# Patient Record
Sex: FEMALE | Race: WHITE | HISPANIC OR LATINO | URBAN - METROPOLITAN AREA
[De-identification: names, ages, dates, MRNs, and addresses within clinical notes are randomized per-mention and may not be internally consistent; named-entity substitution may affect disease eponyms.]

---

## 2020-12-21 ENCOUNTER — APPOINTMENT (RX ONLY)
Dept: URBAN - METROPOLITAN AREA CLINIC 102 | Facility: CLINIC | Age: 75
Setting detail: DERMATOLOGY
End: 2020-12-21

## 2020-12-21 DIAGNOSIS — L29.8 OTHER PRURITUS: ICD-10-CM

## 2020-12-21 DIAGNOSIS — L29.89 OTHER PRURITUS: ICD-10-CM

## 2020-12-21 PROCEDURE — ? ORDER TESTS

## 2020-12-21 PROCEDURE — 99202 OFFICE O/P NEW SF 15 MIN: CPT

## 2020-12-21 PROCEDURE — ? COUNSELING

## 2020-12-21 NOTE — PROCEDURE: COUNSELING
Detail Level: Generalized
Patient Specific Counseling (Will Not Stick From Patient To Patient): Recommend anti itch lotion and claratin for itch

## 2023-04-04 ENCOUNTER — APPOINTMENT (RX ONLY)
Dept: URBAN - METROPOLITAN AREA CLINIC 81 | Facility: CLINIC | Age: 78
Setting detail: DERMATOLOGY
End: 2023-04-04

## 2023-04-04 DIAGNOSIS — Z41.9 ENCOUNTER FOR PROCEDURE FOR PURPOSES OTHER THAN REMEDYING HEALTH STATE, UNSPECIFIED: ICD-10-CM

## 2023-04-04 PROCEDURE — ? COSMETIC QUOTE

## 2023-04-04 PROCEDURE — ? COSMETIC CONSULTATION: BOTOX

## 2023-04-04 NOTE — PROCEDURE: COSMETIC QUOTE
Dysport Price Per Unit: 15
Naseem Price Per Syringe: [address]
Notice: We have created a more complete Cosmetic Quote plan. The procedure name is also Cosmetic Quote. Please review the new plan and hide the Cosmetic Quote plan you do not want to use.
Restylane Price Per Syringe: 500
Manpreet Price Per Syringe: [phone] [facility]
Discount Percentage: 0
Units Of Botox: [facility]
Juvederm Price Per Syringe: [address] [facility] [address]
Botox Price Per Unit: 12
Voluma Price Per Syringe: [address] [facility]
Detail Level: Simple

## 2023-07-28 ENCOUNTER — APPOINTMENT (RX ONLY)
Dept: URBAN - METROPOLITAN AREA CLINIC 81 | Facility: CLINIC | Age: 78
Setting detail: DERMATOLOGY
End: 2023-07-28

## 2023-07-28 DIAGNOSIS — Z41.9 ENCOUNTER FOR PROCEDURE FOR PURPOSES OTHER THAN REMEDYING HEALTH STATE, UNSPECIFIED: ICD-10-CM

## 2023-07-28 PROCEDURE — ? BOTOX

## 2023-07-28 PROCEDURE — ? COSMETIC QUOTE

## 2023-07-28 NOTE — PROCEDURE: COSMETIC QUOTE
Laser 12 Units: 0
Injectable 10 Free Text Discount (In Dollars- Use Only Numbers And Decimals): 0.00
Laser 8: Profound-Cheek Scarring
Laser 4 Price/Unit (In Dollars- Use Only Numbers And Decimals): 1250.00
Laser 16 Price/Unit (In Dollars- Use Only Numbers And Decimals): 400.00
Injectable 7 Price/Unit (In Dollars- Use Only Numbers And Decimals): 800.00
Injectable  11: Botox
Laser 1 Price/Unit (In Dollars- Use Only Numbers And Decimals): 2500.00
Misc Procedure 8 Price/Unit (In Dollars- Use Only Numbers And Decimals): 250.00
Misc Procedure 1: Threadlift Per Thread
Laser 5: SK Removal-Face
Laser 13 Price/Unit (In Dollars- Use Only Numbers And Decimals): 1500.00
Injectable 4 Price/Unit (In Dollars- Use Only Numbers And Decimals): 795.00
Injectable  11 Units: [address] [facility]
Misc Procedure 5 Price/Unit (In Dollars- Use Only Numbers And Decimals): 2000.00
Injectable 8: Noel
Laser 17: LHR-Arms
Laser 2: Full Face and Neck-Fusion
Laser 10 Price/Unit (In Dollars- Use Only Numbers And Decimals): 1000.00
Injectable  12 Price/Unit (In Dollars- Use Only Numbers And Decimals): 12.00
Laser 14: LHR-Lip
Misc Procedure 2 Price/Unit (In Dollars- Use Only Numbers And Decimals): 200.00
Injectable 1 Price/Unit (In Dollars- Use Only Numbers And Decimals): 650.00
Injectable 5: Volbella 0.55cc
Include Sales Tax On Facility Fees: No
Injectable 5 Price/Unit (In Dollars- Use Only Numbers And Decimals): 395.00
Laser 6: Sk Removal-Face and Neck
Laser 18: LHR-Legs/Back-TBD
Face Procedure 1: mole removal
Laser 3: Full Face-Mid Depth
Laser 11 Price/Unit (In Dollars- Use Only Numbers And Decimals): 500.00
Injectable  13 Price/Unit (In Dollars- Use Only Numbers And Decimals): 600.00
Laser 15: Yoana Rodrigues
Number Of Months: 1
Injectable 6: Fabrizio Muñoz
Misc Procedure 7: [address]
Misc Procedure 3 Price/Unit (In Dollars- Use Only Numbers And Decimals): 10.00
Laser 12: IPL Photoface-Face and Neck
Misc Procedure 4: Threads Neck Lift
Injectable 3: Versa
Injectable  14: Qwo/Session
Laser 16: Emmie
Injectable 7: Volux
Detail Level: Zone
Misc Procedure 8: Thread for pox scarring-cheeks
Laser 1: Full Face-Fusion
Laser 13: IPL Photofacial-Face, Neck, Chest
Misc Procedure 1 Price/Unit (In Dollars- Use Only Numbers And Decimals): 225.00
Injectable 4: Volbella 1cc
Injectable 11 Free Text Discount (In Dollars- Use Only Numbers And Decimals): [address]
Misc Procedure 5: Threads Eyelift
Include Sales Tax On Surgeon's Fees: Yes
Laser 9: CO2RE- Pox scarring-cheeks
Injectable 1: Radiesse
Misc Procedure 2: Collagen threads-Lip - lips
Injectable  12: Xeomin
Laser 2 Price/Unit (In Dollars- Use Only Numbers And Decimals): 3000.00
Misc Procedure 6: Threads Browlift
Laser 10: CO2RE Intima
Additional Note (Will Following Above Verbiage): Lex in July- buy 30 units get 10 free\\n\\nPatient had a total of 38 units; received 8 units free from Botox sample bottle\\n Lot [id number] Exp 
Injectable 2: Voluma
Laser 11: IPL Photofacial
Injectable  13: Kybella/Vial
Laser 3 Price/Unit (In Dollars- Use Only Numbers And Decimals): 1800.00
Misc Procedure 3: Collagen Stimulating threads Per thread only
Laser 15 Price/Unit (In Dollars- Use Only Numbers And Decimals): 300.00
Laser 7: VV/PPP on Genitals
Misc Procedure 7 Price/Unit (In Dollars- Use Only Numbers And Decimals): 50.00
Injectable 6 Price/Unit (In Dollars- Use Only Numbers And Decimals): 700.00
Laser 4: Full Face-Light
Laser 12 Price/Unit (In Dollars- Use Only Numbers And Decimals): 900.00

## 2023-07-28 NOTE — PROCEDURE: BOTOX
Additional Area 6 Location: Crow’s
Left Periorbital Units: 0
Detail Level: Detailed
Show Lcl Units: No
Show Additional Area 5: Yes
Incrementing Botox Units: By 0.5 Units
Additional Area 6 Units: 24
Show Inventory Tab: Show
Additional Area 4 Location: Bunny Lines
Additional Area 1 Location: Lat Brow
Additional Area 1 Units: 4
Forehead Units: 10
Additional Area 3 Location: Tear Trough
Consent: Written consent obtained. Risks include but not limited to lid/brow ptosis, bruising, swelling, diplopia, temporary effect, incomplete chemical denervation.
Dilution (U/0.1 Cc): 2
Post-Care Instructions: Patient instructed to avoid lying down or bending over for 4 hours and limit physical activity for 24 hours. Rec patient exercise facial muscles for 20-30 minutes. Can ice if a bruise develops and take acetaminophen or an NSAID if a headache develops post treatment.
Additional Area 5 Location: Lip Flip
Additional Area 2 Location: Chin

## 2023-12-09 ENCOUNTER — APPOINTMENT (RX ONLY)
Dept: URBAN - METROPOLITAN AREA CLINIC 84 | Facility: CLINIC | Age: 78
Setting detail: DERMATOLOGY
End: 2023-12-09

## 2023-12-09 DIAGNOSIS — Z41.9 ENCOUNTER FOR PROCEDURE FOR PURPOSES OTHER THAN REMEDYING HEALTH STATE, UNSPECIFIED: ICD-10-CM

## 2023-12-09 PROCEDURE — ? BOTOX

## 2023-12-09 PROCEDURE — ? COSMETIC QUOTE

## 2023-12-09 PROCEDURE — ? COSMETIC CONSULTATION: BOTOX

## 2023-12-09 PROCEDURE — ? ADDITIONAL NOTES

## 2023-12-09 NOTE — PROCEDURE: ADDITIONAL NOTES
Additional Notes: [employer] promotion buy 30 units get 10 free \\nBOTOX SAMPLE \\nLOT- [id number]\\nEXP-5/2026
Render Risk Assessment In Note?: no
Detail Level: Simple

## 2023-12-09 NOTE — PROCEDURE: COSMETIC QUOTE
Body Procedure 4 Units: 0
Injectable 1 Price/Unit (In Dollars- Use Only Numbers And Decimals): 750.00
Laser 10 Price/Unit (In Dollars- Use Only Numbers And Decimals): 1000.00
Misc Procedure 1: Threadlift Per Thread
Breast Procedure 10 Free Text Discount (In Dollars- Use Only Numbers And Decimals): 0.00
Injectable 10: RHA 3
Injectable 5: Volbella 0.55cc
Laser 14: LHR-Lip
Laser 2: Full Face and Neck-Fusion
Injectable  14 Price/Unit (In Dollars- Use Only Numbers And Decimals): 800.00
Injectable 2: Versa
Misc Procedure 5 Price/Unit (In Dollars- Use Only Numbers And Decimals): 2000.00
Laser 11: IPL Photofacial
Injectable 2 Units: 2
Additional Note (Will Following Above Verbiage): Versa is only a [employer] promo- Only in December buy a syringe of filler get a syringe free. \\n\\nVersa to be put in nasal tip and lips, whatever is a left over will be added to Nasal labial folds.
Laser 3 Price/Unit (In Dollars- Use Only Numbers And Decimals): 1800.00
Laser 15 Price/Unit (In Dollars- Use Only Numbers And Decimals): 300.00
Laser 4 Price/Unit (In Dollars- Use Only Numbers And Decimals): 1250.00
Injectable 6 Price/Unit (In Dollars- Use Only Numbers And Decimals): 795.00
Misc Procedure 6: Threads Browlift
Injectable  11 Price/Unit (In Dollars- Use Only Numbers And Decimals): 12.00
Misc Procedure 2 Price/Unit (In Dollars- Use Only Numbers And Decimals): 200.00
Laser 19: RIDGE WILSON
Face Procedure 1 Price/Unit (In Dollars- Use Only Numbers And Decimals): 250.00
Laser 8: Profound-Cheek Scarring
Injectable 3: Voluma
Laser 12: IPL Photoface-Face and Neck
Injectable 9: RHA 4
Number Of Months: 1
Injectable 7 Price/Unit (In Dollars- Use Only Numbers And Decimals): 700.00
Laser 16 Price/Unit (In Dollars- Use Only Numbers And Decimals): 400.00
Misc Procedure 7: WEDERM Perfect Pout
Misc Procedure 3 Price/Unit (In Dollars- Use Only Numbers And Decimals): 10.00
Laser 9: CO2RE- Pox scarring-cheeks
Laser 5 Price/Unit (In Dollars- Use Only Numbers And Decimals): 500.00
Laser 1 Price/Unit (In Dollars- Use Only Numbers And Decimals): 2500.00
Misc Procedure 4: Threads Neck Lift
Laser 13 Price/Unit (In Dollars- Use Only Numbers And Decimals): 1500.00
Laser 17: LHR-Arms
Laser 6: Sk Removal-Face and Neck
Injectable 8: Skinvive
Injectable  13: Kybella/Vial
Send Charges To Patient Encounter: No
Misc Procedure 1 Price/Unit (In Dollars- Use Only Numbers And Decimals): 225.00
Include Sales Tax On Surgeon's Fees: Yes
Laser 18: LHR-Legs/Back-TBD
Injectable 5 Price/Unit (In Dollars- Use Only Numbers And Decimals): 395.00
Laser 2 Price/Unit (In Dollars- Use Only Numbers And Decimals): 3000.00
Misc Procedure 5: Threads Eyelift
Injectable  14: Qwo/Session
Laser 7: VV/PPP on Genitals
Injectable 2 [place]/Unit (In Dollars- Use Only Numbers And Decimals): 600.00
Injectable  11: Botox
Laser 15: LHR-Axilla
Laser 4: Full Face-Light
Laser 3: Full Face-Mid Depth
Misc Procedure 2: Collagen threads-Lip - lips
Injectable 6: Volbella 1cc
Face Procedure 1: mole removal
Injectable  11 Units: 35
Misc Procedure 3: Collagen Stimulating threads Per thread only
Injectable 7: Vollure
Laser 16: Emmie
Laser 12 Price/Unit (In Dollars- Use Only Numbers And Decimals): 900.00
Face Procedure 2: punch excision-lip
Injectable  12: Xeomin
Laser 5: Lesion Removal
Misc Procedure 7 Price/Unit (In Dollars- Use Only Numbers And Decimals): 50.00
Injectable 4: Volux
Laser 1: Full Face-Fusion
Laser 13: IPL Photofacial-Face, Neck, Chest
Detail Level: Zone
Injectable 1: Radiesse
Laser 10: CO2RE Intima
Misc Procedure 8: Thread for pox scarring-cheeks

## 2023-12-09 NOTE — PROCEDURE: BOTOX
Left Periorbital Skin Units: 0
Show Glabellar Units: Yes
Additional Area 3 Location: Tear Trough
Additional Area 6 Location: Crow’s
Incrementing Botox Units: By 0.5 Units
Forehead Units: 8
Show Inventory Tab: Show
Inferior Lateral Orbicularis Oculi Units: 4
Show Right And Left Pupillary Line Units: No
Glabellar Complex Units: 5
Additional Area 2 Location: Chin
Additional Area 5 Location: Lip Flip
Additional Area 4 Location: Bunny Lines
Consent: Written consent obtained. Risks include but not limited to lid/brow ptosis, bruising, swelling, diplopia, temporary effect, incomplete chemical denervation.
Additional Area 1 Location: Nasal Columella
Periorbital Skin Units: 16
Detail Level: Detailed
Dilution (U/0.1 Cc): 2
Post-Care Instructions: Patient instructed to avoid lying down or bending over for 4 hours and limit physical activity for 24 hours. Rec patient exercise facial muscles for 20-30 minutes.  Can ice if a bruise develops and take acetaminophen or an NSAID if a headache develops post treatment.

## 2024-01-09 ENCOUNTER — APPOINTMENT (RX ONLY)
Dept: URBAN - METROPOLITAN AREA CLINIC 85 | Facility: CLINIC | Age: 79
Setting detail: DERMATOLOGY
End: 2024-01-09

## 2024-01-09 DIAGNOSIS — L73.9 FOLLICULAR DISORDER, UNSPECIFIED: ICD-10-CM

## 2024-01-09 DIAGNOSIS — L663 OTHER SPECIFIED DISEASES OF HAIR AND HAIR FOLLICLES: ICD-10-CM

## 2024-01-09 DIAGNOSIS — L738 OTHER SPECIFIED DISEASES OF HAIR AND HAIR FOLLICLES: ICD-10-CM

## 2024-01-09 DIAGNOSIS — L259 CONTACT DERMATITIS AND OTHER ECZEMA, UNSPECIFIED CAUSE: ICD-10-CM

## 2024-01-09 PROBLEM — L02.02 FURUNCLE OF FACE: Status: ACTIVE | Noted: 2024-01-09

## 2024-01-09 PROBLEM — L23.9 ALLERGIC CONTACT DERMATITIS, UNSPECIFIED CAUSE: Status: ACTIVE | Noted: 2024-01-09

## 2024-01-09 PROCEDURE — ? COUNSELING

## 2024-01-09 PROCEDURE — 99204 OFFICE O/P NEW MOD 45 MIN: CPT

## 2024-01-09 PROCEDURE — ? PRESCRIPTION

## 2024-01-09 RX ORDER — MOMETASONE FUROATE 1 MG/G
CREAM TOPICAL
Qty: 45 | Refills: 2 | Status: ERX | COMMUNITY
Start: 2024-01-09

## 2024-01-09 RX ORDER — MUPIROCIN 20 MG/G
OINTMENT TOPICAL
Qty: 22 | Refills: 2 | Status: ERX | COMMUNITY
Start: 2024-01-09

## 2024-01-09 RX ADMIN — MUPIROCIN: 20 OINTMENT TOPICAL at 00:00

## 2024-01-09 RX ADMIN — MOMETASONE FUROATE: 1 CREAM TOPICAL at 00:00

## 2024-01-09 ASSESSMENT — LOCATION ZONE DERM
LOCATION ZONE: FACE
LOCATION ZONE: LIP

## 2024-01-09 ASSESSMENT — LOCATION DETAILED DESCRIPTION DERM
LOCATION DETAILED: LEFT CHIN
LOCATION DETAILED: PHILTRUM

## 2024-01-09 ASSESSMENT — LOCATION SIMPLE DESCRIPTION DERM
LOCATION SIMPLE: UPPER LIP
LOCATION SIMPLE: CHIN

## 2024-01-09 NOTE — PROCEDURE: MIPS QUALITY
Quality 47: Advance Care Plan: Advance Care Planning discussed and documented in the medical record; patient did not wish or was not able to name a surrogate decision maker or provide an advance care plan.
Quality 226: Preventive Care And Screening: Tobacco Use: Screening And Cessation Intervention: Patient screened for tobacco use and is an ex/non-smoker
Detail Level: Detailed
Quality 402: Tobacco Use And Help With Quitting Among Adolescents: Patient screened for tobacco and is an ex-smoker
Quality 431: Preventive Care And Screening: Unhealthy Alcohol Use - Screening: Patient not identified as an unhealthy alcohol user when screened for unhealthy alcohol use using a systematic screening method